# Patient Record
Sex: FEMALE | Race: WHITE | NOT HISPANIC OR LATINO | Employment: FULL TIME | URBAN - METROPOLITAN AREA
[De-identification: names, ages, dates, MRNs, and addresses within clinical notes are randomized per-mention and may not be internally consistent; named-entity substitution may affect disease eponyms.]

---

## 2023-09-25 ENCOUNTER — ANNUAL EXAM (OUTPATIENT)
Dept: OBGYN CLINIC | Facility: CLINIC | Age: 38
End: 2023-09-25
Payer: COMMERCIAL

## 2023-09-25 VITALS
BODY MASS INDEX: 22.64 KG/M2 | DIASTOLIC BLOOD PRESSURE: 78 MMHG | HEIGHT: 64 IN | SYSTOLIC BLOOD PRESSURE: 104 MMHG | WEIGHT: 132.6 LBS

## 2023-09-25 DIAGNOSIS — F17.200 SMOKER: ICD-10-CM

## 2023-09-25 DIAGNOSIS — R10.31 RIGHT LOWER QUADRANT ABDOMINAL PAIN: ICD-10-CM

## 2023-09-25 DIAGNOSIS — Z12.31 ENCOUNTER FOR SCREENING MAMMOGRAM FOR MALIGNANT NEOPLASM OF BREAST: ICD-10-CM

## 2023-09-25 DIAGNOSIS — Z11.3 SCREENING FOR STD (SEXUALLY TRANSMITTED DISEASE): ICD-10-CM

## 2023-09-25 DIAGNOSIS — R87.810 PAPANICOLAOU SMEAR OF CERVIX WITH POSITIVE HIGH RISK HUMAN PAPILLOMA VIRUS (HPV) TEST: ICD-10-CM

## 2023-09-25 DIAGNOSIS — Z01.419 ENCOUNTER FOR GYNECOLOGICAL EXAMINATION WITHOUT ABNORMAL FINDING: Primary | ICD-10-CM

## 2023-09-25 DIAGNOSIS — N94.6 DYSMENORRHEA: ICD-10-CM

## 2023-09-25 DIAGNOSIS — Z12.4 SCREENING FOR CERVICAL CANCER: ICD-10-CM

## 2023-09-25 PROCEDURE — 99396 PREV VISIT EST AGE 40-64: CPT | Performed by: OBSTETRICS & GYNECOLOGY

## 2023-09-25 NOTE — PROGRESS NOTES
[address] [place] [address], [address], [facility], [address] [facility],8    ASSESSMENT/PLAN: Calli Holbrook is a 34 y.o. [id number] who presents for annual gynecologic exam.    Encounter for routine gynecologic examination  - Routine well woman exam completed today. - Cervical Cancer Screening: Current ASCCP Guidelines reviewed. Last Pap: 04/05/2022 . Next Pap Due: today   HPV + normal cytology   - COVID vaccine  - STI screening offered including HIV testing: orders given    - Contraceptive counseling discussed. Current contraception: condoms or combination OCPs:   - Breast Cancer Screening: Last Mammogram Not on file, order given     Additional problems addressed during this visit:  1. Encounter for gynecological examination without abnormal finding    2. Encounter for screening mammogram for malignant neoplasm of breast  -     Mammo screening bilateral w 3d & cad; Future    3. Smoker  Comments:  previous use of  OCP now smoking  . Offered  POP or  prog  IUD     4. Screening for cervical cancer  -     IGP,CtNg,AptimaHPV,rfx16/18,45    5. Papanicolaou smear of cervix with positive high risk human papilloma virus (HPV) test  Comments:  Repeat  pap today  2022 neg cytology . hpv  hr  +  neg  16,18   Orders:  -     IGP,CtNg,AptimaHPV,rfx16/18,45    6. Dysmenorrhea  Comments:  Motirn 600 mg every 6 hours while awake     7. Screening for STD (sexually transmitted disease)  -     IGP,CtNg,AptimaHPV,rfx16/18,45  -     Hepatitis B surface antigen; Future  -     RPR, Rfx Qn RPR/Confirm TP; Future  -     Hepatitis C antibody; Future  -     HIV 1/2 AG/AB W REFLEX [facility] only; Future  -     Hepatitis B surface antigen  -     RPR, Rfx Qn RPR/Confirm TP  -     Hepatitis C antibody  -     HIV 1/2 AG/AB W REFLEX [facility] only    8.  Right lower quadrant abdominal pain  Comments:  Pt states  comes and  goes more frequent to get tv us day 5-10   Orders:  -     US pelvis complete w transvaginal; Future; Expected date: 10/25/2023        CC: Annual Gynecologic Examination    HPI: Stephanie Adam is a 34 y.o. [id number] who presents for annual gynecologic examination. 35 yo  here for  Wellness exam.    On ocp  Denies Aches and  BTB.    +  notmal cytology and HPV + in   repeat today   +  Pin in  rlq more day  Than not. No relation to period. + increase in smoking .  from . Needs std  Testing . Counseling done . Off ocp  Was doing  No break. Menses are light. The following portions of the patient's history were reviewed and updated as appropriate: She  has no past medical history on file. She  has a past surgical history that includes Mammo (historical) and Nose surgery (). Her family history includes Diabetes in her maternal grandfather; Early menopause in her mother; Endometriosis in her mother; Heart disease in her maternal grandfather and mother; Hyperlipidemia in her mother; Hypertension in her mother. She  reports that she has been smoking. She has been smoking an average of .25 packs per day. She has never used smokeless tobacco. She reports current alcohol use. She reports that she does not use drugs. Current Outpatient Medications   Medication Sig Dispense Refill   • metroNIDAZOLE (METROGEL) 0.75 % vaginal gel INSERT 1 APPLICATORFUL VAGINALLY EVERY DAY AT NIGHT AS DIRECTED FOR 5 DAYS       No current facility-administered medications for this visit. She has No Known Allergies. .    Review of Systems   Constitutional: Negative for chills and fever. HENT: Negative for ear pain and sore throat. Eyes: Negative for pain and visual disturbance. Respiratory: Negative for cough and shortness of breath. Cardiovascular: Negative for chest pain and palpitations. Gastrointestinal: Negative for abdominal pain and vomiting. Genitourinary: Positive for pelvic pain. Negative for dysuria and hematuria. Musculoskeletal: Negative for arthralgias and back pain.    Skin: Negative for color change and rash. Neurological: Negative. Negative for seizures and syncope. Hematological: Negative. Psychiatric/Behavioral: Negative. All other systems reviewed and are negative. Objective:  /78 (BP Location: Left arm, Patient Position: Sitting, Cuff Size: Standard)   Ht 5' 3.85" (1.622 m)   Wt 60.1 kg (132 lb 9.6 oz)   LMP 09/10/2023 (Exact Date)   Breastfeeding No   BMI 22.87 kg/m²    Physical Exam  Vitals and nursing note reviewed. Constitutional:       Appearance: Normal appearance. HENT:      Head: Normocephalic. Cardiovascular:      Rate and Rhythm: Normal rate and regular rhythm. Pulses: Normal pulses. Heart sounds: Normal heart sounds. Pulmonary:      Effort: Pulmonary effort is normal.      Breath sounds: Normal breath sounds. Chest:      Chest wall: No mass, lacerations, swelling, tenderness or edema. Breasts: Steven Score is 4. Breasts are symmetrical.      Right: Normal. No swelling, bleeding, inverted nipple, mass, nipple discharge, skin change or tenderness. Left: No swelling, bleeding, inverted nipple, mass, nipple discharge, skin change or tenderness. Abdominal:      General: Abdomen is flat. Bowel sounds are normal.      Palpations: Abdomen is soft. Genitourinary:     General: Normal vulva. Exam position: Lithotomy position. Pubic Area: No rash. Steven stage (genital): 4.      Labia:         Right: No rash, tenderness or lesion. Left: No rash, tenderness or lesion. Urethra: No urethral pain, urethral swelling or urethral lesion. Vagina: Normal.      Cervix: Normal.      Uterus: Normal.       Adnexa: Left adnexa normal.        Right: Tenderness present. Rectum: Normal.   Musculoskeletal:         General: Normal range of motion. Cervical back: Neck supple. Lymphadenopathy:      Upper Body:      Right upper body: No supraclavicular, axillary or pectoral adenopathy. Left upper body: No supraclavicular, axillary or pectoral adenopathy. Lower Body: No right inguinal adenopathy. No left inguinal adenopathy. Skin:     General: Skin is warm and dry. Neurological:      General: No focal deficit present. Mental Status: She is alert and oriented to person, place, and time. Psychiatric:         Mood and Affect: Mood normal.         Behavior: Behavior normal.         Thought Content:  Thought content normal.

## 2023-09-25 NOTE — PATIENT INSTRUCTIONS
Pap every  3-5 years if normal, sexually transmitted infection testing as indicated, exercise most days of week, obtain appropriate diet and hydration, Calcium 1000mg + 600 vit D daily, birth control as directed (ACHES reviewed). Benefits, risks and alternatives discussed/reviewed. HPV 9 vaccine recommended through age 39. Check with your insurance for coverage. If covered, call office to schedule start of vaccine series. Annual mammogram starting at age 36, monthly breast self exam. [employer] times twice daily.

## 2023-09-28 LAB
C TRACH RRNA CVX QL NAA+PROBE: NEGATIVE
CYTOLOGIST CVX/VAG CYTO: NORMAL
DX ICD CODE: NORMAL
HPV GENOTYPE REFLEX: NORMAL
HPV I/H RISK 4 DNA CVX QL PROBE+SIG AMP: NEGATIVE
N GONORRHOEA RRNA CVX QL NAA+PROBE: NEGATIVE
OTHER STN SPEC: NORMAL
PATH REPORT.FINAL DX SPEC: NORMAL
SL AMB NOTE:: NORMAL
SL AMB SPECIMEN ADEQUACY: NORMAL
SL AMB TEST METHODOLOGY: NORMAL

## 2023-09-30 LAB
HBV SURFACE AG SERPL QL IA: NEGATIVE
HCV AB S/CO SERPL IA: NON REACTIVE
HIV 1+2 AB+HIV1 P24 AG SERPL QL IA: NON REACTIVE
RPR SER QL: NON REACTIVE

## 2023-11-24 PROBLEM — Z11.3 SCREENING EXAMINATION FOR STD (SEXUALLY TRANSMITTED DISEASE): Status: RESOLVED | Noted: 2023-09-25 | Resolved: 2023-11-24

## 2023-11-24 PROBLEM — Z11.3 SCREENING FOR STD (SEXUALLY TRANSMITTED DISEASE): Status: RESOLVED | Noted: 2023-09-25 | Resolved: 2023-11-24

## 2023-11-24 PROBLEM — Z12.4 SCREENING FOR CERVICAL CANCER: Status: RESOLVED | Noted: 2023-09-25 | Resolved: 2023-11-24

## 2023-11-24 PROBLEM — Z01.419 ENCOUNTER FOR GYNECOLOGICAL EXAMINATION WITHOUT ABNORMAL FINDING: Status: RESOLVED | Noted: 2023-09-25 | Resolved: 2023-11-24

## 2024-09-04 ENCOUNTER — NURSE TRIAGE (OUTPATIENT)
Age: 39
End: 2024-09-04

## 2024-09-04 ENCOUNTER — OFFICE VISIT (OUTPATIENT)
Dept: OBGYN CLINIC | Facility: CLINIC | Age: 39
End: 2024-09-04
Payer: COMMERCIAL

## 2024-09-04 VITALS
SYSTOLIC BLOOD PRESSURE: 98 MMHG | WEIGHT: 138 LBS | DIASTOLIC BLOOD PRESSURE: 72 MMHG | HEIGHT: 64 IN | BODY MASS INDEX: 23.56 KG/M2

## 2024-09-04 DIAGNOSIS — Z11.3 SCREENING EXAMINATION FOR STI: ICD-10-CM

## 2024-09-04 DIAGNOSIS — N89.8 VAGINAL DISCHARGE: Primary | ICD-10-CM

## 2024-09-04 LAB
BV WHIFF TEST VAG QL: NEGATIVE
CLUE CELLS SPEC QL WET PREP: NEGATIVE
PH SMN: 4.5 [PH]
SL AMB POCT WET MOUNT: NEGATIVE
T VAGINALIS VAG QL WET PREP: NEGATIVE
YEAST VAG QL WET PREP: NEGATIVE

## 2024-09-04 PROCEDURE — 87210 SMEAR WET MOUNT SALINE/INK: CPT | Performed by: STUDENT IN AN ORGANIZED HEALTH CARE EDUCATION/TRAINING PROGRAM

## 2024-09-04 PROCEDURE — 99213 OFFICE O/P EST LOW 20 MIN: CPT | Performed by: STUDENT IN AN ORGANIZED HEALTH CARE EDUCATION/TRAINING PROGRAM

## 2024-09-04 NOTE — TELEPHONE ENCOUNTER
"Call to pt. States since yesterday she has noticed some white runny discharge with a foul odor and mild abdominal cramping. States she has been with a new partner, last intercourse was 2 weeks ago. Believes that her partner does have other partners. She would like STD testing. Also states she is late for her period, LMP 8/2/24. Had a negative pregnancy test yesterday. Pt is not on birth control. Denies any rash, irritation, vaginal pain. Pt scheduled for this afternoon and is thankful.     Reason for Disposition   Patient is worried they have a sexually transmitted infection (STI)    Answer Assessment - Initial Assessment Questions  1. DISCHARGE: \"Describe the discharge.\" (e.g., white, yellow, green, gray, foamy, cottage cheese-like)      Thin white discharge  2. ODOR: \"Is there a bad odor?\"      yes  3. ONSET: \"When did the discharge begin?\"      yesterday  4. RASH: \"Is there a rash in that area?\" If Yes, ask: \"Describe it.\" (e.g., redness, blisters, sores, bumps)      denies  5. ABDOMINAL PAIN: \"Are you having any abdominal pain?\" If Yes, ask: \"What does it feel like? \" (e.g., crampy, dull, intermittent, constant)       Mild cramping  6. ABDOMINAL PAIN SEVERITY: If present, ask: \"How bad is it?\"  (e.g., mild, moderate, severe)   - MILD - doesn't interfere with normal activities    - MODERATE - interferes with normal activities or awakens from sleep    - SEVERE - patient doesn't want to move (R/O peritonitis)       mild  7. CAUSE: \"What do you think is causing the discharge?\" \"Have you had the same problem before? What happened then?\"      Unsure, STD?  8. OTHER SYMPTOMS: \"Do you have any other symptoms?\" (e.g., fever, itching, vaginal bleeding, pain with urination, injury to genital area, vaginal foreign body)      denies  9. PREGNANCY: \"Is there any chance you are pregnant?\" \"When was your last menstrual period?\"      LMP 8/2/24    Protocols used: Vaginal Discharge-ADULT-[place]    "

## 2024-09-04 NOTE — TELEPHONE ENCOUNTER
Regarding: discharge and odor for 2 days now,  ----- Message from Irma LEON sent at 9/4/2024  8:20 AM EDT -----  Pt called in, stated having discharge and odor for 2 days now, established pt at [place]

## 2024-09-04 NOTE — ASSESSMENT & PLAN NOTE
- Given symptoms and recent new sexual partner, recommend GC/CT (collected today) screening as well as HIV, RPR, and hep C (orders provided today). Assuming blood work negative, discussed repeat testing in 6 months if high concern for exposure.

## 2024-09-04 NOTE — PROGRESS NOTES
[facility] - [address], [address], [place] [address]    Assessment/Plan:  1. Vaginal discharge  Assessment & Plan:  - No abnormal findings on wet mount. No exam findings concerning for PID.   - Will send Nuswab vaginitis panel and treat if positive.   Orders:  -     POCT wet mount  -     NuSwab Vaginitis Plus (VG+)  2. Screening examination for STI  Assessment & Plan:  - Given symptoms and recent new sexual partner, recommend GC/CT (collected today) screening as well as HIV, RPR, and hep C (orders provided today). Assuming blood work negative, discussed repeat testing in 6 months if high concern for exposure.   Orders:  -     HIV 1/2 AG/AB W REFLEX [facility] only; Future  -     Hepatitis C antibody; Future  -     RPR, Rfx Qn RPR/Confirm TP; Future  -     HIV 1/2 AG/AB W REFLEX [facility] and [facility] only  -     Hepatitis C antibody  -     RPR, Rfx Qn RPR/Confirm TP  -     NuSwab Vaginitis Plus (VG+)      Subjective:   Drea Nielsen is a 38 y.o.  presenting for evaluation of vaginal discharge and concern for STI exposure.   CC:   Chief Complaint   Patient presents with    STD concerns     Having more discharge, a little odor, not her normal. Sometimes a little itching. She noticed it Monday night but has gotten worse the last couple of days.       HPI: Patient reports 3-day history of abnormal vaginal discharge, odor, and vaginal irritation. She reports a new sexual partner in July. They have had sexual contact 3 times. No known STI, but she is concerned that he may have exposed her to something. She reports mild pelvic cramping, but no abdominal pain or fever. She reports regular menses, although she skipped last month. Pregnancy test yesterday was negative, per patient report. She is currently not using any form of contraception.     ROS: Negative except as noted in HPI    Patient's last menstrual period was 2024.       She  reports that she is not currently sexually  active and has had partner(s) who are male. She reports using the following methods of birth control/protection: OCP and Pill.       The following portions of the patient's history were reviewed and updated as appropriate:   History reviewed. No pertinent past medical history.  Past Surgical History:   Procedure Laterality Date    MAMMO (HISTORICAL)      NOSE SURGERY  2022     Family History   Problem Relation Age of Onset    Heart disease Mother     Endometriosis Mother     Early menopause Mother     Hyperlipidemia Mother     Hypertension Mother     Heart disease Maternal Grandfather     Diabetes Maternal Grandfather      Social History     Socioeconomic History    Marital status: Single     Spouse name: None    Number of children: None    Years of education: None    Highest education level: None   Occupational History    None   Tobacco Use    Smoking status: Every Day     Current packs/day: 0.25     Types: Cigarettes     Passive exposure: Current    Smokeless tobacco: Never   Vaping Use    Vaping status: Never Used   Substance and Sexual Activity    Alcohol use: Yes     Comment: socially    Drug use: Never    Sexual activity: Not Currently     Partners: Male     Birth control/protection: OCP, Pill   Other Topics Concern    None   Social History Narrative    Exercise: Occassionally    Domestic violence: No    History of drug/alcohol abuse denies         Social Determinants of Health     Financial Resource Strain: Not on file   Food Insecurity: Not on file   Transportation Needs: Not on file   Physical Activity: Not on file   Stress: Not on file   Social Connections: Not on file   Intimate Partner Violence: Not on file   Housing Stability: Not on file     Outpatient Medications Marked as Taking for the 9/4/24 encounter (Office Visit) with Colton Bowles MD   Medication    [DISCONTINUED] metroNIDAZOLE (METROGEL) 0.75 % vaginal gel     No Known Allergies        Objective:  BP 98/72 (BP Location: Right arm, Patient  "Position: Sitting, Cuff Size: Standard)    5' 3.5\" (1.613 m)   Wt 62.6 kg (138 lb)   LMP 08/02/2024   BMI 24.06 kg/m²        Chaperone present? Yes: Srinivasa Gomez MA.    General Appearance: alert and oriented, in no acute distress.   Abdomen: Soft, non-tender, non-distended, no masses, no rebound or guarding.  Pelvic:       External genitalia: Normal appearance, no abnormal pigmentation, no lesions or masses. Normal [place]'s and [place]'s.      Urinary system: Urethral meatus normal, bladder non-tender.      Vaginal: normal mucosa without prolapse or lesions. Normal-appearing physiologic discharge.      Cervix: Normal-appearing, well-epithelialized, no gross lesions or masses. No cervical motion tenderness.      Adnexa: No adnexal masses or tenderness noted.      Uterus: Normal-sized, regular contour, midline, mobile, no uterine tenderness.   Extremities: Normal range of motion.   Skin: normal, no rash or abnormalities  Neurologic: alert, oriented x3  Psychiatric: Appropriate affect, mood stable, cooperative with exam.        Colton Bowles MD  9/4/2024 2:18 PM  "

## 2024-09-06 DIAGNOSIS — N89.8 VAGINAL DISCHARGE: Primary | ICD-10-CM

## 2024-09-06 LAB
A VAGINAE DNA VAG QL NAA+PROBE: ABNORMAL SCORE
BVAB2 DNA VAG QL NAA+PROBE: ABNORMAL SCORE
C ALBICANS DNA VAG QL NAA+PROBE: NEGATIVE
C GLABRATA DNA VAG QL NAA+PROBE: NEGATIVE
C TRACH RRNA SPEC QL NAA+PROBE: NEGATIVE
HCV AB S/CO SERPL IA: NON REACTIVE
HIV 1+2 AB+HIV1 P24 AG SERPL QL IA: NON REACTIVE
MEGA1 DNA VAG QL NAA+PROBE: ABNORMAL SCORE
N GONORRHOEA RRNA SPEC QL NAA+PROBE: NEGATIVE
RPR SER QL: NON REACTIVE
T VAGINALIS RRNA SPEC QL NAA+PROBE: NEGATIVE

## 2024-09-06 RX ORDER — METRONIDAZOLE 7.5 MG/G
1 GEL VAGINAL
Qty: 5 G | Refills: 0 | Status: SHIPPED | OUTPATIENT
Start: 2024-09-06 | End: 2024-09-11

## 2024-09-10 ENCOUNTER — TELEPHONE (OUTPATIENT)
Age: 39
End: 2024-09-10

## 2024-09-10 NOTE — TELEPHONE ENCOUNTER
----- Message from Colton Bowles MD sent at 9/9/2024 11:36 AM EDT -----  Patient has not read MyChart result message. Please call patient, review diagnosis of BV, and have her start Metrogel prescribed Friday.     Colton Bowles MD  9/9/2024 11:36 AM

## 2024-10-04 PROBLEM — Z11.3 SCREENING EXAMINATION FOR STI: Status: RESOLVED | Noted: 2024-09-04 | Resolved: 2024-10-04

## 2025-01-07 ENCOUNTER — NURSE TRIAGE (OUTPATIENT)
Dept: OBGYN CLINIC | Facility: CLINIC | Age: 40
End: 2025-01-07

## 2025-01-07 ENCOUNTER — PATIENT MESSAGE (OUTPATIENT)
Dept: OBGYN CLINIC | Facility: CLINIC | Age: 40
End: 2025-01-07

## 2025-01-07 DIAGNOSIS — B96.89 BV (BACTERIAL VAGINOSIS): ICD-10-CM

## 2025-01-07 DIAGNOSIS — N89.8 VAGINAL DISCHARGE: Primary | ICD-10-CM

## 2025-01-07 DIAGNOSIS — N76.0 BV (BACTERIAL VAGINOSIS): ICD-10-CM

## 2025-01-07 RX ORDER — METRONIDAZOLE 7.5 MG/G
1 GEL VAGINAL
Qty: 5 G | Refills: 0 | Status: SHIPPED | OUTPATIENT
Start: 2025-01-07 | End: 2025-01-12

## 2025-01-07 NOTE — PATIENT COMMUNICATION
If patient has history of BV in prior two years, prescribe: Yes, previously treated 9/2023    -Metrogel Intravaginally x 5 nights, with no refills.    If patient refuses the intravaginal medication and is requesting a PO medication, prescribe:    -Flagyl 500mg BID PO x 7 days, with no refills    Medication instructions:  Please instruct patient that they cannot drink alcohol while on Flagyl.    Please instruct patient that they should not have sex while on treatment or 3 days after if using Metrogel.    If patient is having recurrent BV infections, please schedule appointment (should be seen within 5 days).      Escalated to RN to provide RX per protocol,.

## 2025-04-15 ENCOUNTER — OFFICE VISIT (OUTPATIENT)
Dept: OBGYN CLINIC | Facility: CLINIC | Age: 40
End: 2025-04-15
Payer: COMMERCIAL

## 2025-04-15 VITALS
DIASTOLIC BLOOD PRESSURE: 60 MMHG | SYSTOLIC BLOOD PRESSURE: 98 MMHG | WEIGHT: 164 LBS | HEIGHT: 63 IN | BODY MASS INDEX: 29.06 KG/M2

## 2025-04-15 DIAGNOSIS — Z11.3 SCREENING EXAMINATION FOR VENEREAL DISEASE: ICD-10-CM

## 2025-04-15 DIAGNOSIS — N89.8 VAGINAL DISCHARGE: ICD-10-CM

## 2025-04-15 DIAGNOSIS — B37.9 YEAST INFECTION: Primary | ICD-10-CM

## 2025-04-15 LAB
CLUE CELLS SPEC QL WET PREP: ABNORMAL
PH SMN: 4 [PH]
SL AMB POCT WET MOUNT: ABNORMAL
T VAGINALIS VAG QL WET PREP: ABNORMAL
YEAST VAG QL WET PREP: ABNORMAL

## 2025-04-15 PROCEDURE — 99213 OFFICE O/P EST LOW 20 MIN: CPT | Performed by: PHYSICIAN ASSISTANT

## 2025-04-15 PROCEDURE — 87210 SMEAR WET MOUNT SALINE/INK: CPT | Performed by: PHYSICIAN ASSISTANT

## 2025-04-15 RX ORDER — VITAMIN B COMPLEX
1 CAPSULE ORAL DAILY
COMMUNITY

## 2025-04-15 RX ORDER — FLUCONAZOLE 150 MG/1
150 TABLET ORAL EVERY OTHER DAY
Qty: 2 TABLET | Refills: 0 | Status: SHIPPED | OUTPATIENT
Start: 2025-04-15 | End: 2025-04-18

## 2025-04-15 RX ORDER — VALACYCLOVIR HYDROCHLORIDE 500 MG/1
TABLET, FILM COATED ORAL
COMMUNITY
Start: 2025-04-07

## 2025-04-15 RX ORDER — SACCHAROMYCES BOULARDII 250 MG
250 CAPSULE ORAL DAILY
COMMUNITY

## 2025-04-15 RX ORDER — NALTREXONE HYDROCHLORIDE 50 MG/1
1 TABLET, FILM COATED ORAL DAILY
COMMUNITY
Start: 2025-04-07

## 2025-04-15 RX ORDER — MULTIVIT-MIN/IRON FUM/FOLIC AC 7.5 MG-4
1 TABLET ORAL DAILY
COMMUNITY

## 2025-04-15 RX ORDER — OMEGA-3S/DHA/EPA/FISH OIL/D3 300MG-1000
400 CAPSULE ORAL DAILY
COMMUNITY

## 2025-04-15 NOTE — ASSESSMENT & PLAN NOTE
Reviewed yeast infection on exam.   Will plan to treat with Diflucan 150mg every other day for 2 doses.   STD cultures done today. Vaginitis panel sent as well.   Script for HIV, Hep B &C and RPR given upon patient's request.   For prevention: Recommend acidophilus or yogurt daily, avoid irritating soaps or lotions, no tight fitted pants or underwear, avoid bubble baths, do not stay in wet swimsuit.  Return to office for annual exam or as needed.

## 2025-04-15 NOTE — PROGRESS NOTES
[facility] - [place]  [address], [address], [place] [address]    ASSESSMENT/PLAN:     1. Yeast infection  Assessment & Plan:  Reviewed yeast infection on exam.   Will plan to treat with Diflucan 150mg every other day for 2 doses.   STD cultures done today. Vaginitis panel sent as well.   Script for HIV, Hep B &C and RPR given upon patient's request.   For prevention: Recommend acidophilus or yogurt daily, avoid irritating soaps or lotions, no tight fitted pants or underwear, avoid bubble baths, do not stay in wet swimsuit.  Return to office for annual exam or as needed.     Orders:  -     fluconazole (DIFLUCAN) 150 mg tablet; Take 1 tablet (150 mg total) by mouth every other day for 2 doses  2. Vaginal discharge  -     NuSwab Vaginitis Plus (VG+)  -     POCT wet mount  3. Screening examination for venereal disease  -     NuSwab Vaginitis Plus (VG+)  -     HIV 1/2 AG/AB W REFLEX [facility] only; Future  -     Hepatitis B surface antigen; Future  -     Hepatitis C antibody; Future  -     RPR, Rfx Qn RPR/Confirm TP; Future  -     HIV 1/2 AG/AB W REFLEX [facility] only  -     Hepatitis B surface antigen  -     Hepatitis C antibody  -     RPR, Rfx Qn RPR/Confirm TP      CC:  STD cultures, follow up from BV.     HPI: Drea Nielsen is a 39 y.o.  who presents for STD cultures. Reports new partner. Hx of recurrent BV. Treated with Metrogel she has left over about 2 weeks ago, finished Metrogel about a week ago. Denies any symptoms currently.     ROS: Negative except as noted in HPI    Patient's last menstrual period was 2025 (exact date).       She  reports that she is not currently sexually active and has had partner(s) who are male. She reports using the following methods of birth control/protection: OCP and Pill.       The following portions of the patient's history were reviewed and updated as appropriate:   History reviewed. No pertinent past medical history.  Past  Surgical History:   Procedure Laterality Date    MAMMO (HISTORICAL)      NOSE SURGERY  2022     Family History   Problem Relation Age of Onset    Heart disease Mother     Endometriosis Mother     Early menopause Mother     Hyperlipidemia Mother     Hypertension Mother     Heart disease Maternal Grandfather     Diabetes Maternal Grandfather      Social History     Socioeconomic History    Marital status: Single     Spouse name: None    Number of children: None    Years of education: None    Highest education level: None   Occupational History    None   Tobacco Use    Smoking status: Every Day     Current packs/day: 0.25     Types: Cigarettes     Passive exposure: Current    Smokeless tobacco: Never   Vaping Use    Vaping status: Never Used   Substance and Sexual Activity    Alcohol use: Yes     Comment: socially    Drug use: Never    Sexual activity: Not Currently     Partners: Male     Birth control/protection: OCP, Pill   Other Topics Concern    None   Social History Narrative    Exercise: Occassionally    Domestic violence: No    History of drug/alcohol abuse denies         Social Drivers of Health     Financial Resource Strain: Not on file   Food Insecurity: Not on file   Transportation Needs: Not on file   Physical Activity: Not on file   Stress: Not on file   Social Connections: Not on file   Intimate Partner Violence: Not on file   Housing Stability: Not on file     Outpatient Medications Marked as Taking for the 4/15/25 encounter (Office Visit) with Tiny Walls PA-C   Medication    b complex vitamins capsule    cholecalciferol (VITAMIN D3) 400 units tablet    fluconazole (DIFLUCAN) 150 mg tablet    Multiple Vitamins-Minerals (multivitamin with minerals) tablet    naltrexone (REVIA) 50 mg tablet    psyllium (METAMUCIL) 58.6 % packet    saccharomyces boulardii (FLORASTOR) 250 mg capsule    valACYclovir (VALTREX) 500 mg tablet    VITAMIN D-VITAMIN K PO     No Known Allergies        Objective:  BP 98/60  "(BP Location: Left arm, Patient Position: Sitting, Cuff Size: Standard)   Ht 5' 3\" (1.6 m)   Wt 74.4 kg (164 lb)   LMP 04/07/2025 (Exact Date)   BMI 29.05 kg/m²        Chaperone present? Yes: Shey FLOWER Student.    Physical Exam  Constitutional:       Appearance: Normal appearance. She is well-developed.   Genitourinary:      Vulva and bladder normal.      No lesions in the vagina.      Right Labia: No rash, tenderness, lesions or skin changes.     Left Labia: No tenderness, lesions, skin changes or rash.     No labial fusion noted.      No inguinal adenopathy present in the right or left side.     Vaginal discharge (thick white discharge in vaginal vault.) present.      No vaginal erythema, tenderness or bleeding.        Right Adnexa: not tender, not full and no mass present.     Left Adnexa: not tender, not full and no mass present.     No cervical motion tenderness, discharge or lesion.      Uterus is not enlarged, tender or irregular.      No uterine mass detected.     No urethral prolapse, tenderness or mass present.      Bladder is not tender.    HENT:      Head: Normocephalic and atraumatic.   Neck:      Thyroid: No thyromegaly.   Cardiovascular:      Rate and Rhythm: Normal rate and regular rhythm.      Heart sounds: Normal heart sounds. No murmur heard.     No friction rub. No gallop.   Pulmonary:      Effort: Pulmonary effort is normal. No respiratory distress.      Breath sounds: Normal breath sounds. No wheezing.   Abdominal:      General: There is no distension.      Palpations: Abdomen is soft. There is no mass.      Tenderness: There is no abdominal tenderness. There is no guarding or rebound.      Hernia: No hernia is present.   Lymphadenopathy:      Cervical: No cervical adenopathy.      Upper Body:      Right upper body: No pectoral adenopathy.      Left upper body: No pectoral adenopathy.      Lower Body: No right inguinal adenopathy. No left inguinal adenopathy.   Neurological:      " Mental Status: She is alert and oriented to person, place, and time.   Skin:     General: Skin is warm and dry.   Psychiatric:         Behavior: Behavior normal.       Results for orders placed or performed in visit on 04/15/25   POCT wet mount   Result Value Ref Range    WET MOUNT yeast     Yeast, Wet Prep hyphae present     pH 4     Clue Cells neg     Trich, Wet Prep neg              Tiny Walls PA-C  4/15/2025 11:16 AM

## 2025-04-17 ENCOUNTER — RESULTS FOLLOW-UP (OUTPATIENT)
Dept: OBGYN CLINIC | Facility: CLINIC | Age: 40
End: 2025-04-17

## 2025-04-17 DIAGNOSIS — B96.89 BACTERIAL VAGINOSIS: Primary | ICD-10-CM

## 2025-04-17 DIAGNOSIS — N76.0 BACTERIAL VAGINOSIS: Primary | ICD-10-CM

## 2025-04-17 LAB
A VAGINAE DNA VAG QL NAA+PROBE: ABNORMAL SCORE
BVAB2 DNA VAG QL NAA+PROBE: ABNORMAL SCORE
C ALBICANS DNA VAG QL NAA+PROBE: POSITIVE
C GLABRATA DNA VAG QL NAA+PROBE: NEGATIVE
C TRACH RRNA SPEC QL NAA+PROBE: NEGATIVE
MEGA1 DNA VAG QL NAA+PROBE: ABNORMAL SCORE
N GONORRHOEA RRNA SPEC QL NAA+PROBE: NEGATIVE
T VAGINALIS RRNA SPEC QL NAA+PROBE: NEGATIVE

## 2025-04-17 RX ORDER — METRONIDAZOLE 7.5 MG/G
1 GEL VAGINAL
Qty: 70 G | Refills: 0 | Status: SHIPPED | OUTPATIENT
Start: 2025-04-17 | End: 2025-04-22